# Patient Record
Sex: FEMALE | Race: OTHER | Employment: UNEMPLOYED | URBAN - METROPOLITAN AREA
[De-identification: names, ages, dates, MRNs, and addresses within clinical notes are randomized per-mention and may not be internally consistent; named-entity substitution may affect disease eponyms.]

---

## 2017-01-23 ENCOUNTER — OFFICE VISIT (OUTPATIENT)
Dept: FAMILY MEDICINE CLINIC | Age: 47
End: 2017-01-23

## 2017-01-23 VITALS
WEIGHT: 144.4 LBS | HEIGHT: 63 IN | TEMPERATURE: 98.2 F | BODY MASS INDEX: 25.59 KG/M2 | DIASTOLIC BLOOD PRESSURE: 72 MMHG | HEART RATE: 74 BPM | OXYGEN SATURATION: 99 % | RESPIRATION RATE: 18 BRPM | SYSTOLIC BLOOD PRESSURE: 123 MMHG

## 2017-01-23 DIAGNOSIS — M77.12 LATERAL EPICONDYLITIS OF LEFT ELBOW: Primary | ICD-10-CM

## 2017-01-23 RX ORDER — NAPROXEN 500 MG/1
500 TABLET ORAL 2 TIMES DAILY WITH MEALS
Qty: 30 TAB | Refills: 0 | Status: SHIPPED | OUTPATIENT
Start: 2017-01-23

## 2017-01-23 NOTE — PROGRESS NOTES
Yolanda Stanton is a 51 y.o. female in today with c/o left arm pain 10/10 x 2-3 months. Learning assessment previously completed; primary [facility] is [facility]. 1. Have you been to the ER, urgent care clinic since your last visit? Hospitalized since your last visit? No    2. Have you seen or consulted any other health care providers outside of the [address] [place] [address] since your last visit? Include any pap smears or colon screening.  No

## 2017-01-23 NOTE — MR AVS SNAPSHOT
Visit Information Rhina Gibbons jaden Amanda Personal Médico Departamento Teléfono del Dep. Número de visita 1/23/2017 12:30 PM [facility], [address] [phone] [id number] Follow-up Instructions Return if symptoms worsen or fail to improve. Upcoming Health Maintenance Date Due Pneumococcal 19-64 Medium Risk (1 of 1 - PPSV23) 3/5/1988 DTaP/Tdap/Td series (1 - Tdap) 3/5/1990 PAP AKA CERVICAL CYTOLOGY 3/5/1990 INFLUENZA AGE 9 TO ADULT 8/1/2016 Alergias  Review Complete El: 1/23/2017 Por: Madhu Cordova LPN A partir del:  1/23/2017 Intensidad Anotado Tipo de reacción [employer] Latex  10/31/2013    Swelling Mouth. Oxycodone-acetaminophen Medium 06/24/2016   Intolerance Other (comments) Feeling of numbness Vacunas actuales David Maillard No hay ninguna vacuna archivada. No revisadas esta visita You Were Diagnosed With   
  
 Radha June Lateral epicondylitis of left elbow    -  Primary ICD-10-CM: M77.12 
ICD-9-CM: 726.32 Partes vitales PS Pulso Temperatura Resp [place] ( percentil de crecimiento) Peso (percentil de crecimiento) 123/72 (BP 1 Location: Right arm, BP Patient Position: Sitting) 74 98.2 °F (36.8 °C) (Oral) 18 5' 3\" (1.6 m) 144 lb 6.4 oz (65.5 kg) LMP (última suhail) SpO2 BMI [place] Estado obstétrico Estatus de tabaquísmo 01/08/2017 99% 25.58 kg/m2 Having regular periods Current Every Day Smoker Historial de signos vitales BMI and BSA Data Body Mass Index Body Surface Area 25.58 kg/m 2 1.71 m 2 Catina Solomon [facility] Name Phone [phone] [address] [phone] Cristobal lista de medicamentos actualizada Lista actualizada el: 1/23/17  1:41 PM.  Antoine Leidy use cristobal lista de medicamentos más reciente. naproxen 500 mg tablet También conocido leoncio:  NAPROSYN Take 1 Tab by mouth two (2) times daily (with meals). omeprazole 40 mg capsule También conocido leoncio:  [place] Sat Take 40 mg by mouth daily. Recetas Enviado a la [place] Refills  
 naproxen (NAPROSYN) 500 mg tablet 0 Sig: Take 1 Tab by mouth two (2) times daily (with meals). Class: Normal  
 Pharmacy: [address] #: [phone] Route: Oral  
  
Instrucciones de seguimiento Return if symptoms worsen or fail to improve. Instrucciones para el Paciente Please ice the elbow every 2 hours for 15 minutes while awake, for the next 2 weeks. Use Naproxen 2 times per day with food for the next 2 weeks. Please wear wrap during day. Follow up if no improvement in 2 weeks. Tennis Elbow: Exercises Your Care Instructions Here are some examples of typical rehabilitation exercises for your condition. Start each exercise slowly. Ease off the exercise if you start to have pain. Your doctor or physical therapist will tell you when you can start these exercises and which ones will work best for you. How to do the exercises Wrist flexor stretch 1. Extend your arm in front of you with your palm up. 2. Bend your wrist, pointing your hand toward the floor. 3. With your other hand, gently bend your wrist farther until you feel a mild to moderate stretch in your forearm. 4. Hold for at least 15 to 30 seconds. Repeat 2 to 4 times. Wrist extensor stretch Repeat steps 1 to 4 of the stretch above but begin with your extended hand palm down. Ball or sock squeeze 1. Hold a tennis ball (or a rolled-up sock) in your hand. 2. Make a fist around the ball (or sock) and squeeze. 3. Hold for about 6 seconds, and then relax for up to 10 seconds. 4. Repeat 8 to 12 times. 5. Switch the ball (or sock) to your other hand and do 8 to 12 times. Wrist deviation 1. Sit so that your arm is supported but your hand hangs off the edge of a flat surface, such as a table. 2. Hold your hand out like you are shaking hands with someone. 3. Move your hand up and down. 4. Repeat this motion 8 to 12 times. 5. Switch arms. 6. Try to do this exercise twice with each hand. Wrist curls 1. Place your forearm on a table with your hand hanging over the edge of the table, palm up. 2. Place a 1- to 2-pound weight in your hand. This may be a dumbbell, a can of food, or a filled water bottle. 3. Slowly raise and lower the weight while keeping your forearm on the table and your palm facing up. 4. Repeat this motion 8 to 12 times. 5. Switch arms, and do steps 1 through 4. 
6. Repeat with your hand facing down toward the floor. Switch arms. Biceps curls 1. Sit leaning forward with your legs slightly spread and your left hand on your left thigh. 2. Place your right elbow on your right thigh, and hold the weight with your forearm horizontal. 
3. Slowly curl the weight up and toward your chest. 
4. Repeat this motion 8 to 12 times. 5. Switch arms, and do steps 1 through 4. Follow-up care is a key part of your treatment and safety. Be sure to make and go to all appointments, and call your doctor if you are having problems. It's also a good idea to know your test results and keep a list of the medicines you take. Where can you learn more? Go to http://brayan-puja.info/. Enter T992 in the search box to learn more about \"Tennis Elbow: Exercises. \" Current as of: May 23, 2016 Content Version: 11.1 © 2694-4714 [employer]. Care instructions adapted under license by [employer] (which disclaims liability or warranty for this information). If you have questions about a medical condition or this instruction, always ask your healthcare professional. [address] any warranty or liability for your use of this information. Tennis Elbow: Exercises Your Care Instructions Here are some examples of typical rehabilitation exercises for your condition. Start each exercise slowly. Ease off the exercise if you start to have pain. Your doctor or physical therapist will tell you when you can start these exercises and which ones will work best for you. How to do the exercises Wrist flexor stretch 5. Extend your arm in front of you with your palm up. 6. Bend your wrist, pointing your hand toward the floor. 7. With your other hand, gently bend your wrist farther until you feel a mild to moderate stretch in your forearm. 8. Hold for at least 15 to 30 seconds. Repeat 2 to 4 times. Wrist extensor stretch Repeat steps 1 to 4 of the stretch above but begin with your extended hand palm down. Ball or sock squeeze 6. Hold a tennis ball (or a rolled-up sock) in your hand. 7. Make a fist around the ball (or sock) and squeeze. 8. Hold for about 6 seconds, and then relax for up to 10 seconds. 9. Repeat 8 to 12 times. 10. Switch the ball (or sock) to your other hand and do 8 to 12 times. Wrist deviation 7. Sit so that your arm is supported but your hand hangs off the edge of a flat surface, such as a table. 8. Hold your hand out like you are shaking hands with someone. 9. Move your hand up and down. 10. Repeat this motion 8 to 12 times. 11. Switch arms. 12. Try to do this exercise twice with each hand. Wrist curls 7. Place your forearm on a table with your hand hanging over the edge of the table, palm up. 8. Place a 1- to 2-pound weight in your hand. This may be a dumbbell, a can of food, or a filled water bottle. 9. Slowly raise and lower the weight while keeping your forearm on the table and your palm facing up. 10. Repeat this motion 8 to 12 times. 11. Switch arms, and do steps 1 through 4. 
12. Repeat with your hand facing down toward the floor. Switch arms. Biceps curls 6. Sit leaning forward with your legs slightly spread and your left hand on your left thigh. 7. Place your right elbow on your right thigh, and hold the weight with your forearm horizontal. 
8. Slowly curl the weight up and toward your chest. 
9. Repeat this motion 8 to 12 times. 10. Switch arms, and do steps 1 through 4. Follow-up care is a key part of your treatment and safety. Be sure to make and go to all appointments, and call your doctor if you are having problems. It's also a good idea to know your test results and keep a list of the medicines you take. Where can you learn more? Go to http://brayan-puja.info/. Enter B558 in the search box to learn more about \"Tennis Elbow: Exercises. \" Current as of: May 23, 2016 Content Version: 11.1 © 9309-4545 [employer]. Care instructions adapted under license by [employer] (which disclaims liability or warranty for this information). If you have questions about a medical condition or this instruction, always ask your healthcare professional. [address] any warranty or liability for your use of this information. Introducing [facility]! Estimado Danuta : 
Aminta por solicitar indra cuenta de MyChart usted. Nuestros registros indican que usted ya tiene indra cuenta MyChart [place]. Usted puede acceder a antoine cuenta en cualquier momento en [email or web address] Sabía usted que usted puede acceder a antoine hospital y las instrucciones de noemí ER en cualquier momento en MyChart ? También puede revisar [facility] de las pruebas de antoine hospitalización o visita a urgencias . Información Adicional 
 
Si tiene alguna pregunta , por favor visite la sección de preguntas frecuentes del sitio web MyChart en [email or web address] . Recuerde, MyChart NO es que se utilizará para las necesidades urgentes. Para emergencias médicas , llame al 911 . Ahora disponible en antoine iPhone y Android ! Por favor proporcione javier resumen de la documentación de cuidado a antoine próximo proveedor. Your primary care clinician is listed as Kenna Samayoa. If you have any questions after today's visit, please call [phone].

## 2017-01-23 NOTE — PROGRESS NOTES
Arm Pain (left)        HPI: Italo Smith is a 51 y.o. female, Niraj speaking, new to me, accompanied by her . Language line with Niraj  was used during interview and exam. Alise Bowie  reports Left elbow pain for the last 2-3 months. No injuries. No swelling of the joint. It hurts to push on the outside of the elbow. She gets radiating pain down the arm if she tries to lift anything with the left arm. She has tried ibuprofen intermittently with minimal improvement. She does work in cleaning houses. Past Medical History   Diagnosis Date    GERD (gastroesophageal reflux disease)        Current Outpatient Prescriptions   Medication Sig    omeprazole (PRILOSEC) 40 mg capsule Take 40 mg by mouth daily. No current facility-administered medications for this visit. Allergies   Allergen Reactions    Latex Swelling     Mouth.  Oxycodone-Acetaminophen Other (comments)     Feeling of numbness       Past Surgical History   Procedure Laterality Date    Hx dilation and curettage      Hx pelvic laparoscopy      Hx myomectomy         No family history on file. Social History     Social History    Marital status:      Spouse name: N/A    Number of children: N/A    Years of education: N/A     Occupational History    Not on file. Social History Main Topics    Smoking status: Current Every Day Smoker     Packs/day: 0.25     Types: Cigarettes    Smokeless tobacco: Never Used    Alcohol use No      Comment: socially    Drug use: No    Sexual activity: Not on file     Other Topics Concern    Not on file     Social History Narrative       Review of Systems   Musculoskeletal: Positive for joint pain. Negative for falls.          Visit Vitals    /72 (BP 1 Location: Right arm, BP Patient Position: Sitting)    Pulse 74    Temp 98.2 °F (36.8 °C) (Oral)    Resp 18    Ht 5' 3\" (1.6 m)    Wt 144 lb 6.4 oz (65.5 kg)    LMP 01/08/2017    SpO2 99%    BMI 25.58 kg/m2 Physical Exam   Constitutional: She is oriented to person, place, and time. She appears well-developed and well-nourished. No distress. HENT:   Head: Normocephalic and atraumatic. Right Ear: External ear normal.   Left Ear: External ear normal.   Musculoskeletal:        Left elbow: She exhibits normal range of motion, no swelling, no effusion and no deformity. Tenderness found. Lateral epicondyle tenderness noted. No radial head, no medial epicondyle and no olecranon process tenderness noted. Pain in left lateralelbow with opposed pronation of forearm. Left arm N/V intact   Neurological: She is alert and oriented to person, place, and time. Skin: Skin is warm. She is not diaphoretic. Psychiatric: She has a normal mood and affect. Nursing note and vitals reviewed. Assesment:  1. Lateral epicondylitis of left elbow  Ice regularly as instructed in PI. Use tennis elbow wrap regularly. No heavy lifting greater than 5 pounds with left arm. We did discuss possible need for steroid injection if not improving on NSAID/Ice therapy  - naproxen (NAPROSYN) 500 mg tablet; Take 1 Tab by mouth two (2) times daily (with meals). Dispense: 30 Tab; Refill: 0      I have discussed the diagnosis with the patient and the intended management  The patient has received an after-visit summary and questions were answered concerning future plans. I have discussed medication usage, side effects and warnings with the patient as well. I have reviewed the plan of care with the patient, accepted their input and they are in agreement with the treatment goals. Follow-up Disposition:  Return if symptoms worsen or fail to improve.       Annika Becerril MD                .

## 2017-11-09 ENCOUNTER — OFFICE VISIT (OUTPATIENT)
Dept: FAMILY MEDICINE CLINIC | Age: 47
End: 2017-11-09

## 2017-11-09 VITALS
HEART RATE: 79 BPM | WEIGHT: 146.8 LBS | TEMPERATURE: 98.2 F | DIASTOLIC BLOOD PRESSURE: 71 MMHG | RESPIRATION RATE: 18 BRPM | SYSTOLIC BLOOD PRESSURE: 113 MMHG | BODY MASS INDEX: 26.01 KG/M2 | OXYGEN SATURATION: 98 % | HEIGHT: 63 IN

## 2017-11-09 DIAGNOSIS — L03.019 PARONYCHIA OF FINGER, UNSPECIFIED LATERALITY: Primary | ICD-10-CM

## 2017-11-09 RX ORDER — CEPHALEXIN 250 MG/1
250 CAPSULE ORAL 4 TIMES DAILY
Qty: 40 CAP | Refills: 0 | Status: SHIPPED | OUTPATIENT
Start: 2017-11-09 | End: 2017-11-19

## 2017-11-09 RX ORDER — ACETAMINOPHEN AND CODEINE PHOSPHATE 300; 30 MG/1; MG/1
1 TABLET ORAL
Qty: 15 TAB | Refills: 0 | Status: SHIPPED | OUTPATIENT
Start: 2017-11-09

## 2017-11-09 NOTE — PROGRESS NOTES
Progress Note  Today's Date:  2017   Patient:  Tammy Schreiber  Patient :  1970    Subjective:   Tammy Schreiber is a 49 y.o. female patient of Sammie Quinonez who present with c/o Left Index Nail bed pain and swelling. She is here with her son and . Onset: About a week  Context: She has a habit of biting her nail  Site: Left index and third finger of right butler  Type of pain or sensation: Pulsating and sharp  Severity:*rate pain 10/10  Relieving factors: Has been soaking fingers in hydrogen peroxide; has also been taking Motrin which have not helped. Current Outpatient Meds and Allergies     Current Outpatient Prescriptions on File Prior to Visit   Medication Sig Dispense Refill    naproxen (NAPROSYN) 500 mg tablet Take 1 Tab by mouth two (2) times daily (with meals). 30 Tab 0    omeprazole (PRILOSEC) 40 mg capsule Take 40 mg by mouth daily. No current facility-administered medications on file prior to visit. These medications have been reviewed and reconciled with the patient during today's visit. Allergies   Allergen Reactions    Latex Swelling     Mouth.  Oxycodone-Acetaminophen Other (comments)     Feeling of numbness       ROS:     Pertinent items as noted in the HPI. Objective:     VS:    Visit Vitals    /71 (BP 1 Location: Left arm, BP Patient Position: Sitting)    Pulse 79    Temp 98.2 °F (36.8 °C) (Oral)    Resp 18    Ht 5' 3\" (1.6 m)    Wt 146 lb 12.8 oz (66.6 kg)    LMP 2017 (Exact Date)    SpO2 98%    BMI 26 kg/m2       General:   Well-nourished, well-groomed, pleasant, alert, in no acute distress. Extremities:   Lateral aspect of left index nail bed with moderate edema and mild redness, tender to touch; lateral right third finger nail bed with mild edema and mild redness, minimum pain on palpation; no drainage. Neuro:   Alert, conversant, appropriate, following commands, no focal deficits. Assessment:       1.  Paronychia of finger, unspecified laterality        Plan:       Orders Placed This Encounter    cephALEXin (KEFLEX) 250 mg capsule     Sig: Take 1 Cap by mouth four (4) times daily for 10 days. Dispense:  40 Cap     Refill:  0    acetaminophen-codeine (TYLENOL #3) 300-30 mg per tablet     Sig: Take 1 Tab by mouth every four (4) hours as needed for Pain. Max Daily Amount: 6 Tabs. Indications: Pain     Dispense:  15 Tab     Refill:  0     Advised to continuing soaking but with warm water and Epson salt and warm water.  reviewed. I have discussed the diagnosis with the patient and the intended plan as seen in the above orders. The patient has received an after-visit summary along with patient information handout. I have discussed medication side effects and warnings with the patient as well. Pt verbalized understanding. Follow-up Disposition:  Return if symptoms worsen or fail to improve.   VJ Pinto  11/9/2017, 3:39 PM

## 2017-11-09 NOTE — PATIENT INSTRUCTIONS
Paroniquia: Instrucciones de cuidado - [ Paronychia: Care Instructions ]  Instrucciones de cuidado  La paroniquia es indra infección de la piel alrededor de indra uña de un dedo de la mano o del pie. Sucede cuando entran microbios a través de un guero en la piel. John vez el médico haya hecho indra pequeña incisión en la cris infectada para sacar el pus. La mayoría de casos de paroniquia mejoran en pocos días. Helen vigile amanda síntomas y [id number] [employer] los consejos de antoine médico. Aunque es poco común, un haroldo leve puede volverse algo más lucille e infectar todo antoine dedo. También es posible que indra infección regrese. La atención de seguimiento es indra parte clave de antoine tratamiento y seguridad. Asegúrese de hacer y acudir a todas las citas, y llame a antoine médico si está teniendo problemas. También es indra buena idea saber los resultados de los exámenes y mantener indra lista de los medicamentos que cassie. ¿Cómo puede cuidarse en el hogar? · Si antoine médico le dijo cómo cuidarse la uña infectada, siga las instrucciones de antoine médico. Si no le tarun instrucciones, siga estos consejos generales:  ¨ Lávese la cris con agua limpia 2 veces al día. No use peróxido de hidrógeno (agua [place]) ni alcohol, los cuales pueden retrasar la sanación. ¨ Puede cubrir la cris con indra capa delgada de vaselina y indra venda no adherente. ¨ Aplíquese más vaselina y [place] la venda según sea necesario. · Si antoine médico le recetó antibióticos, tómelos según las indicaciones. No deje de tomarlos por el hecho de sentirse mejor. Debe marleni todos los antibióticos hasta terminarlos. · [place] un analgésico (medicamento para el dolor) de venta ortega, leoncio acetaminofén (Tylenol), ibuprofeno (Advil, Motrin) o naproxeno (Aleve). Mady y siga todas las instrucciones de la [place]. · No tome dos o más analgésicos al mismo tiempo a menos que el médico se lo haya indicado. Muchos analgésicos contienen acetaminofén, es decir, Tylenol.  El exceso de acetaminofén (Tylenol) puede ser Allyson Geni. · Eleve el dedo por encima del nivel de antoine corazón. [place] ayuda a reducir la hinchazón y el dolor. · Aplíquese calor. Colóquese indra bolsa de [place], indra almohadilla térmica ajustada a baja temperatura o un paño tibio sobre el dedo. No se vaya a dormir con indra almohadilla térmica sobre la piel. · Remoje la cris en agua tibia dos veces al día por 15 minutos cada vez. Después de remojarla, séquese louise la cris y aplíquese indra capa delgada de vaselina. Póngase indra venda nueva. ¿Cuándo debe pedir ayuda? Llame a antoine médico ahora mismo o busque atención médica inmediata si:  ? · Tiene señales de indra infección nueva o que BIA, tales leoncio:  ¨ Aumento del dolor, la hinchazón, la temperatura o el enrojecimiento. ¨ Vetas rojizas que salen de la cris de piel infectada. ¨ Pus que sale de la cris. [place]. ?Preste especial atención a los cambios en antoine [place] y asegúrese de comunicarse con antoine médico si:  ? · No mejora leoncio se esperaba. ¿Dónde puede encontrar más información en inglés? Elisabeth Tomlinson a http://brayan-puja.info/. Escriba C435 en la búsqueda para aprender más acerca de \"Paroniquia: Instrucciones de cuidado - [ Paronychia: Care Instructions ]. \"  Revisado: 13 octubre, 2016  Versión del contenido: 11.4  © 1282-7182 [employer]wise, Incorporated. Las instrucciones de cuidado fueron adaptadas bajo licencia por [employer] (which disclaims liability or warranty for this information). Si usted tiene [place] afección médica o sobre estas instrucciones, siempre pregunte a antoine profesional de [place]. [facility] niega toda garantía o responsabilidad por antoine uso de esta información.

## 2017-11-09 NOTE — PROGRESS NOTES
Shawna Zavala is a 49 y.o. female here today with c/o finger pain. She first noticed Monday but yesterday it got worse. She has been soaking it but the pain has gotten worse. Learning assessment previously completed.

## 2021-07-15 NOTE — PATIENT INSTRUCTIONS
Please ice the elbow every 2 hours for 15 minutes while awake, for the next 2 weeks. Use Naproxen 2 times per day with food for the next 2 weeks. Please wear wrap during day. Follow up if no improvement in 2 weeks. Tennis Elbow: Exercises  Your Care Instructions  Here are some examples of typical rehabilitation exercises for your condition. Start each exercise slowly. Ease off the exercise if you start to have pain. Your doctor or physical therapist will tell you when you can start these exercises and which ones will work best for you. How to do the exercises  Wrist flexor stretch    1. Extend your arm in front of you with your palm up. 2. Bend your wrist, pointing your hand toward the floor. 3. With your other hand, gently bend your wrist farther until you feel a mild to moderate stretch in your forearm. 4. Hold for at least 15 to 30 seconds. Repeat 2 to 4 times. Wrist extensor stretch    Repeat steps 1 to 4 of the stretch above but begin with your extended hand palm down. Ball or sock squeeze    1. Hold a tennis ball (or a rolled-up sock) in your hand. 2. Make a fist around the ball (or sock) and squeeze. 3. Hold for about 6 seconds, and then relax for up to 10 seconds. 4. Repeat 8 to 12 times. 5. Switch the ball (or sock) to your other hand and do 8 to 12 times. Wrist deviation    1. Sit so that your arm is supported but your hand hangs off the edge of a flat surface, such as a table. 2. Hold your hand out like you are shaking hands with someone. 3. Move your hand up and down. 4. Repeat this motion 8 to 12 times. 5. Switch arms. 6. Try to do this exercise twice with each hand. Wrist curls    1. Place your forearm on a table with your hand hanging over the edge of the table, palm up. 2. Place a 1- to 2-pound weight in your hand. This may be a dumbbell, a can of food, or a filled water bottle.   3. Slowly raise and lower the weight while keeping your forearm on the table and your palm facing up. 4. Repeat this motion 8 to 12 times. 5. Switch arms, and do steps 1 through 4.  6. Repeat with your hand facing down toward the floor. Switch arms. Biceps curls    1. Sit leaning forward with your legs slightly spread and your left hand on your left thigh. 2. Place your right elbow on your right thigh, and hold the weight with your forearm horizontal.  3. Slowly curl the weight up and toward your chest.  4. Repeat this motion 8 to 12 times. 5. Switch arms, and do steps 1 through 4. Follow-up care is a key part of your treatment and safety. Be sure to make and go to all appointments, and call your doctor if you are having problems. It's also a good idea to know your test results and keep a list of the medicines you take. Where can you learn more? Go to http://brayan-puja.info/. Enter C600 in the search box to learn more about \"Tennis Elbow: Exercises. \"  Current as of: May 23, 2016  Content Version: 11.1  © 2392-1152 [employer]. Care instructions adapted under license by [employer] (which disclaims liability or warranty for this information). If you have questions about a medical condition or this instruction, always ask your healthcare professional. [address] any warranty or liability for your use of this information. Tennis Elbow: Exercises  Your Care Instructions  Here are some examples of typical rehabilitation exercises for your condition. Start each exercise slowly. Ease off the exercise if you start to have pain. Your doctor or physical therapist will tell you when you can start these exercises and which ones will work best for you. How to do the exercises  Wrist flexor stretch    5. Extend your arm in front of you with your palm up. 6. Bend your wrist, pointing your hand toward the floor. 7. With your other hand, gently bend your wrist farther until you feel a mild to moderate stretch in your forearm.   8. Hold for at least 15 to 30 seconds. Repeat 2 to 4 times. Wrist extensor stretch    Repeat steps 1 to 4 of the stretch above but begin with your extended hand palm down. Ball or sock squeeze    6. Hold a tennis ball (or a rolled-up sock) in your hand. 7. Make a fist around the ball (or sock) and squeeze. 8. Hold for about 6 seconds, and then relax for up to 10 seconds. 9. Repeat 8 to 12 times. 10. Switch the ball (or sock) to your other hand and do 8 to 12 times. Wrist deviation    7. Sit so that your arm is supported but your hand hangs off the edge of a flat surface, such as a table. 8. Hold your hand out like you are shaking hands with someone. 9. Move your hand up and down. 10. Repeat this motion 8 to 12 times. 11. Switch arms. 12. Try to do this exercise twice with each hand. Wrist curls    7. Place your forearm on a table with your hand hanging over the edge of the table, palm up. 8. Place a 1- to 2-pound weight in your hand. This may be a dumbbell, a can of food, or a filled water bottle. 9. Slowly raise and lower the weight while keeping your forearm on the table and your palm facing up. 10. Repeat this motion 8 to 12 times. 11. Switch arms, and do steps 1 through 4.  12. Repeat with your hand facing down toward the floor. Switch arms. Biceps curls    6. Sit leaning forward with your legs slightly spread and your left hand on your left thigh. 7. Place your right elbow on your right thigh, and hold the weight with your forearm horizontal.  8. Slowly curl the weight up and toward your chest.  9. Repeat this motion 8 to 12 times. 10. Switch arms, and do steps 1 through 4. Follow-up care is a key part of your treatment and safety. Be sure to make and go to all appointments, and call your doctor if you are having problems. It's also a good idea to know your test results and keep a list of the medicines you take. Where can you learn more? Go to http://brayan-puja.info/.   Enter L325 in the search box to learn more about \"Tennis Elbow: Exercises. \"  Current as of: May 23, 2016  Content Version: 11.1  © 2096-0382 [employer]. Care instructions adapted under license by [employer] (which disclaims liability or warranty for this information). If you have questions about a medical condition or this instruction, always ask your healthcare professional. [place] [address] any warranty or liability for your use of this information. Attending Only